# Patient Record
Sex: MALE | Race: WHITE | Employment: OTHER | URBAN - METROPOLITAN AREA
[De-identification: names, ages, dates, MRNs, and addresses within clinical notes are randomized per-mention and may not be internally consistent; named-entity substitution may affect disease eponyms.]

---

## 2022-12-02 ENCOUNTER — HOSPITAL ENCOUNTER (OUTPATIENT)
Age: 77
Setting detail: OUTPATIENT SURGERY
Discharge: HOME | End: 2022-12-02
Attending: SPECIALIST | Admitting: SPECIALIST
Payer: MEDICARE

## 2022-12-02 VITALS
HEIGHT: 66 IN | BODY MASS INDEX: 33.01 KG/M2 | DIASTOLIC BLOOD PRESSURE: 81 MMHG | WEIGHT: 205.4 LBS | HEART RATE: 65 BPM | OXYGEN SATURATION: 97 % | RESPIRATION RATE: 10 BRPM | SYSTOLIC BLOOD PRESSURE: 153 MMHG | TEMPERATURE: 98.4 F

## 2022-12-02 DIAGNOSIS — R07.89 OTHER CHEST PAIN: ICD-10-CM

## 2022-12-02 PROCEDURE — 74011250636 HC RX REV CODE- 250/636: Performed by: SPECIALIST

## 2022-12-02 PROCEDURE — C1894 INTRO/SHEATH, NON-LASER: HCPCS | Performed by: SPECIALIST

## 2022-12-02 PROCEDURE — 77030003390 HC NDL ANGI MRTM -A: Performed by: SPECIALIST

## 2022-12-02 PROCEDURE — 2709999900 HC NON-CHARGEABLE SUPPLY: Performed by: SPECIALIST

## 2022-12-02 PROCEDURE — 93458 L HRT ARTERY/VENTRICLE ANGIO: CPT | Performed by: SPECIALIST

## 2022-12-02 PROCEDURE — 74011000250 HC RX REV CODE- 250: Performed by: SPECIALIST

## 2022-12-02 PROCEDURE — C1769 GUIDE WIRE: HCPCS | Performed by: SPECIALIST

## 2022-12-02 PROCEDURE — 99152 MOD SED SAME PHYS/QHP 5/>YRS: CPT | Performed by: SPECIALIST

## 2022-12-02 PROCEDURE — 77030004532 HC CATH ANGI DX IMP BSC -A: Performed by: SPECIALIST

## 2022-12-02 PROCEDURE — 77030029065 HC DRSG HEMO QCLOT ZMED -B

## 2022-12-02 PROCEDURE — 77030015766: Performed by: SPECIALIST

## 2022-12-02 PROCEDURE — 74011000636 HC RX REV CODE- 636: Performed by: SPECIALIST

## 2022-12-02 RX ORDER — DIPHENHYDRAMINE HYDROCHLORIDE 50 MG/ML
25 INJECTION, SOLUTION INTRAMUSCULAR; INTRAVENOUS
Status: DISCONTINUED | OUTPATIENT
Start: 2022-12-02 | End: 2022-12-02 | Stop reason: HOSPADM

## 2022-12-02 RX ORDER — SODIUM CHLORIDE 9 MG/ML
75 INJECTION, SOLUTION INTRAVENOUS CONTINUOUS
Status: DISCONTINUED | OUTPATIENT
Start: 2022-12-02 | End: 2022-12-02

## 2022-12-02 RX ORDER — CALCIUM CARBONATE 200(500)MG
1 TABLET,CHEWABLE ORAL
COMMUNITY

## 2022-12-02 RX ORDER — HEPARIN SODIUM 200 [USP'U]/100ML
INJECTION, SOLUTION INTRAVENOUS
Status: COMPLETED | OUTPATIENT
Start: 2022-12-02 | End: 2022-12-02

## 2022-12-02 RX ORDER — LOSARTAN POTASSIUM 50 MG/1
50 TABLET ORAL DAILY
COMMUNITY

## 2022-12-02 RX ORDER — GUAIFENESIN 100 MG/5ML
81 LIQUID (ML) ORAL DAILY
Status: SHIPPED | COMMUNITY
Start: 2022-12-02

## 2022-12-02 RX ORDER — ALLOPURINOL 100 MG/1
100 TABLET ORAL DAILY
COMMUNITY

## 2022-12-02 RX ORDER — FENTANYL CITRATE 50 UG/ML
INJECTION, SOLUTION INTRAMUSCULAR; INTRAVENOUS AS NEEDED
Status: DISCONTINUED | OUTPATIENT
Start: 2022-12-02 | End: 2022-12-02 | Stop reason: HOSPADM

## 2022-12-02 RX ORDER — SODIUM CHLORIDE 0.9 % (FLUSH) 0.9 %
5-40 SYRINGE (ML) INJECTION AS NEEDED
Status: DISCONTINUED | OUTPATIENT
Start: 2022-12-02 | End: 2022-12-02 | Stop reason: HOSPADM

## 2022-12-02 RX ORDER — SODIUM CHLORIDE 0.9 % (FLUSH) 0.9 %
5-40 SYRINGE (ML) INJECTION EVERY 8 HOURS
Status: DISCONTINUED | OUTPATIENT
Start: 2022-12-02 | End: 2022-12-02 | Stop reason: HOSPADM

## 2022-12-02 RX ORDER — MIDAZOLAM HYDROCHLORIDE 1 MG/ML
INJECTION, SOLUTION INTRAMUSCULAR; INTRAVENOUS AS NEEDED
Status: DISCONTINUED | OUTPATIENT
Start: 2022-12-02 | End: 2022-12-02 | Stop reason: HOSPADM

## 2022-12-02 RX ORDER — AMLODIPINE BESYLATE 10 MG/1
10 TABLET ORAL DAILY
COMMUNITY

## 2022-12-02 RX ORDER — LIDOCAINE HYDROCHLORIDE 10 MG/ML
INJECTION INFILTRATION; PERINEURAL AS NEEDED
Status: DISCONTINUED | OUTPATIENT
Start: 2022-12-02 | End: 2022-12-02 | Stop reason: HOSPADM

## 2022-12-02 RX ORDER — ATORVASTATIN CALCIUM 20 MG/1
20 TABLET, FILM COATED ORAL DAILY
Qty: 30 TABLET | Refills: 5 | Status: SHIPPED | OUTPATIENT
Start: 2022-12-02

## 2022-12-02 RX ORDER — SODIUM CHLORIDE 9 MG/ML
125 INJECTION, SOLUTION INTRAVENOUS CONTINUOUS
Status: DISCONTINUED | OUTPATIENT
Start: 2022-12-02 | End: 2022-12-02 | Stop reason: HOSPADM

## 2022-12-02 RX ORDER — OMEPRAZOLE 20 MG/1
20 CAPSULE, DELAYED RELEASE ORAL
COMMUNITY

## 2022-12-02 RX ORDER — UBIDECARENONE 75 MG
300 CAPSULE ORAL DAILY
COMMUNITY

## 2022-12-02 RX ORDER — HYDROCORTISONE SODIUM SUCCINATE 100 MG/2ML
100 INJECTION, POWDER, FOR SOLUTION INTRAMUSCULAR; INTRAVENOUS
Status: DISCONTINUED | OUTPATIENT
Start: 2022-12-02 | End: 2022-12-02 | Stop reason: HOSPADM

## 2022-12-02 RX ADMIN — SODIUM CHLORIDE 75 ML/HR: 9 INJECTION, SOLUTION INTRAVENOUS at 09:56

## 2022-12-02 NOTE — ROUTINE PROCESS
Received patient from waiting area. Armband and allergies verbally confirmed with patient. Procedure explained and consents signed. 1041: TRANSFER - OUT REPORT:    Verbal report given to Lynn Pina (name) on Sreedhar Russo  being transferred to cath lab(unit) for routine progression of care       Report consisted of patients Situation, Background, Assessment and   Recommendations(SBAR). Information from the following report(s) SBAR was reviewed with the receiving nurse. Lines:   Peripheral IV 12/02/22 Right Antecubital (Active)   Site Assessment Clean, dry, & intact 12/02/22 0953   Phlebitis Assessment 0 12/02/22 0953   Infiltration Assessment 0 12/02/22 0953   Dressing Status Clean, dry, & intact 12/02/22 0953   Dressing Type Transparent 12/02/22 0953   Hub Color/Line Status Pink;Flushed; Infusing 12/02/22 3645        Opportunity for questions and clarification was provided. Patient transported with:   Registered Nurse    1120: TRANSFER - IN REPORT:    Verbal report received from [facility]) on Sreedhar Rodriguez  being received from cath lab(unit) for routine post - op      Report consisted of patients Situation, Background, Assessment and   Recommendations(SBAR). Information from the following report(s) Procedure Summary was reviewed with the receiving nurse. Opportunity for questions and clarification was provided. Assessment completed upon patients arrival to unit and care assumed. 1120: Patient received from cath lab. Patient with sheath to right groin site. Site clean, dry and intact. No hematoma. Pulses palpable. VS stable. Patient with no complaints at this time. HOB flat. 11:27: Blood aspirated from sheath then Arterial sheath pulled 6 Fr R Groin. Grupoal Kristine applied. Manual pressure held by [facility] . 11:40: Hemostasis achieved at 11:40. Dressing applied. Pt voices understanding of post procedure bedrest instructions. 1310: Patient HOb elevated 30 degrees. Patient with quikclot to right groin site. Site clean, dry and intact. No hematoma. Pulses palpable. VS stable. Patient with no complaints at this time. 1350: Patient HOb elevated 45 degrees. Patient with quikclot to right groin site. Site clean, dry and intact. No hematoma. Pulses palpable. VS stable. Patient with no complaints at this time. 1400: Discharge instructions and prescriptions reviewed with patient and his wife and daughter. Opportunity provided for questions. Patient, wife and daughter verbalized understanding. Signed copy of discharge placed in the front of patient's chart. 1430: Patient dangled on the side of the bed. Site CDI. No complaints at this time. 1435: Patient ambulated to the bathroom. Voided. Gait steady. No complaints at this time. 1440: IV and tele removed. 1450: Patient escorted via wheelchair to entrance. Patient daughter driving. Patient discharged into care of daughter and wife.

## 2022-12-02 NOTE — Clinical Note
[address] [facility] RN IN CLPO UPDATED ON PTS STATUS IN LAB.  VERBAL BEDSIDE REPORT TO BE GIVEN IN CLPO TO RECEIVING RN

## 2022-12-02 NOTE — DISCHARGE INSTRUCTIONS
Discharge Instructions:   Cardiac Catheterization/Angiography Discharge Instructions    *Check the puncture site frequently for swelling or bleeding. If you see any bleeding, lie down and apply pressure over the area and call 911. Notify your doctor for any redness, swelling, drainage or oozing from the puncture site. Notify your doctor for any fever or chills. *If the leg or arm with the puncture becomes cold, numb or painful, call Dr Nelli Palomino at  [phone]    *Activity should be limited for the next 48 hours. Climb stairs as little as possible and avoid any stooping, bending or strenuous activity for 48 hours. No heavy lifting (anything over 10 pounds) for five days. *Do not drive for 24 hours. *You may resume your usual diet. Drink more fluids than usual.    *Have a responsible person drive you home and stay with you for at least 24 hours after your heart catheterization/angiography. *You may remove the bandage from your groin in 24 hours. You may shower in 24 hours. No tub baths, hot tubs or swimming for one week. Do not place any lotions, creams, powders, ointments over the puncture site for one week. You may place a clean band-aid over the puncture site each day for 5 days. Change this daily. Medications: Take medications as prescribed. Activity:     As tolerated except do not lift over 10 pounds for 5 days. Diet:     American Heart Association. Follow-up:     Follow up with Chino Cote MD on Monday, December 5th, 2022 at 8:30am.  [address] [place] [address]  [phone]      If you smoke, STOP!

## 2022-12-02 NOTE — CARDIO/PULMONARY
Cardiopulmonary Rehab: Slade Johnson admitted for cardiac cath. S/P cath with severe CAD with obstructive 3VD(12/2/22). [id number]% Cardiologist is Dr Marcos Earl. Per Dr Marcos Earl  , plan for CTS consult with Dr Go Pike next week at [address] [facility].

## 2022-12-02 NOTE — PROCEDURES
Cath:  Obstructive 3VD:     LM 70     LAD m80     LCx ost70, p30     RCA ost50, m30, d99  Normal LVF (EF 65%)  No AVG, MR 1+  Patent L SC  RFA manual    Given stable obstructive disease, he is going home, but will F/U with Dr. Go Pike Monday 12/5/22 @ 8:30am to discuss/arrange CABG.   Start ASA, statin

## 2022-12-02 NOTE — H&P
Date of Surgery Update:  Denny Redd was seen and examined. History and physical has been reviewed. The patient has been examined. There have been no significant clinical changes since the completion of the originally dated History and Physical.    Signed By: Kala Kowalski MD     December 2, 2022 10:06 AM        Classic anginal symptoms. Vivek Acosta 1945 Default Progress NoteVisit Date: Wed, Nov 30, 2022 12:00 pmProvider: Delfina Hart MD (Assistant: Kaiden Diaz RN )Location: [facility] AT [facility] ([facility])[address] [place]. [address] [phone] signed by Hermelindo Barnard MD on  11/30/2022 12:47:40 PM                         Subjective:CC: Mr. Josh Ellsworth is a 68year old White male. His primary care physician is Ros Cueva MD.  Ros Cueva MD referred Mr. Josh Ellsworth to the practice for a consult. He presents today with a complaint of chest pain. He is here today following a transition of care from his primary care provider. Patient will call back with list of supplementsPatient did not bring medication list or bottles for review. Patient verbalized medications He has a history of essential hypertension. HPI: classic cp with walking, now unable to walk, for cath  Regarding chest pain:MD Notes: Patient reports he walks alot and episode started when he was on his walk The discomfort is located primarily in the left anterior chest.  The pain initially began one month ago. He characterizes the pain as tightness and Discomfort. It seems to be worse with exertion. Pain improves with rest.  Associated symptoms include heartburn. He states it is not a pain, more a discomfort sometimes tightness with his walking and when he slows his walking it stops. He is an avid walker. At night laying on his side he will feel it too sometimes. No SOB noted. He does have GERD and was gone and now coming back, and notes more gas then he use to have.  He had a stress test many years ago and, so long he can not remember why. He usually does not feel bad and this is not like him. He is sure he will get the sxs on the treadmill. Cardiac cath discussed. He sounds good today  Regarding hypertension: Currently, his treatment regimen consists of an ACE/ARB ( Losartan ) and Norvasc (amlodipine). ROS: Discussed relevant lab and imaging studies along with the plan of treatment with the nurse. EYES:  Negative for blurred vision and eye pain. E/N/T:  Negative for epistaxis and hoarseness. CARDIOVASCULAR:  Please see HPI. RESPIRATORY:  Negative for cough and hemoptysis. GASTROINTESTINAL:  Negative for abdominal pain and dysphagia. MUSCULOSKELETAL:  Negative for arthralgias and back pain. NEUROLOGICAL:  Negative for headaches, paresthesias, and weakness. HEMATOLOGIC/LYMPHATIC:  Negative for easy bruising, bleeding, and lymphadenopathy. PSYCHIATRIC:  No symptoms reported. Past Medical History / Family History / Social History: Last Reviewed on 2022 12:23 PM by Dimitrios Aguirre Medical History: gout Hypertension Surgical History: Surgical/Procedural History: Knee Replacement: bilateral; Rotator cuff surgery carpal tunnel Family History: Father:  at age 67; Cause of death was auto accident Mother:  at age 77; Cause of death was MI;  type 2 diabetes Social History: Social History: Occupation: Retired Marital Status:  Children: 1 child Tobacco/Alcohol/Supplements: Last Reviewed on 2022 12:23 PM by Bernardo Villanueva ETOBACCO/ALCOHOL/SUPPLEMENTS Tobacco: He has a past history of cigarette smoking; quit . Alcohol: Drinks alcohol occasionally. Caffeine:  He admits to consuming caffeine via coffee ( 2 servings per day ). Substance Abuse History: Last Reviewed on 2022 12:23 PM by Bernardo Villanueva [facility] Health History: Last Reviewed on 2022 12:23 PM by Mary Kay Heritage Diseases (eg STDs):  Last Reviewed on 2022 12:23 PM by Dalia Andres Andrew Astudillo EAllergies: Last Reviewed on 11/30/2022 12:23 PM by Verla Record Lou Known Allergies. Current Medications: Last Reviewed on 11/30/2022 12:23 PM by Randi DUARTEOPURINOL 100MG TAB omeprazole 20 mg oral capsule,delayed release (enteric coated) [take 1 capsule (20 mg) by oral route 3 times a week]amLODIPine 10 mg oral tablet [take 1 tablet (10 mg) by oral route once daily]losartan 50 mg oral tablet [take 1 tablet (50 mg) by oral route once per day]Objective:Vitals: Current: 11/30/2022 12:21:58 PMHt:  5 ft, 6 in; Wt: 208 lbs;  BMI: 33. 6BP: 148/84 mm Hg (left arm, sitting);  P: 64 bpmExams: GENERAL:  Alert, oriented to person, place and time x3. EYES:  Normal lids without xanthelasma; conjunctiva unremarkable; no scleral icterus. HEENT:  Face symmetric, voice clear, extraocular muscles intact. NECK:  Supple. No bruits, No JVD. LUNGS:  Clear to auscultation. No rales, no wheezing. CARDIAC:  Regular rate and rhythm. PMI not displaced. ABDOMEN:  Soft. Positive bowel sounds. Non-tender. MUSCULOSKELETAL:  Normal range of motion, strength and tone. EXTREMITIES:  No edema. Good muscle tone and strength. SKIN: No significant rashes or lesions; no suspicious moles. NEUROLOGICAL:  No focal deficits, cranial nerves II-XII are grossly intact. PSYCHIATRIC:  Appropriate affect and demeanor; normal speech pattern; grossly normal memory. Procedures: Chest pain, unspecifiedECG INTERPRETATION: See scanned EKG for results. Assessment: R07.9   Chest pain, unspecified   R07.89   Other chest pain   I10   Essential (primary) hypertension   ORDERS: Procedures Ordered:     10110  Education and train for pt self-mgmt by qualified, nonphysician, arun 30 minutes; individual pt  (Send-Out)          10715  Electrocardiogram, routine with at least 12 leads; with interpretation and report  (In-House)          XCATH  Cardiac Cath  (In-House)      Plan: Chest pain, unspecifiedMedication list has been reviewed.  Continue current medications. Testing/Procedures: Cardiac Catheterization - Explained to the patient the indication, procedure, risks, and benefits of cardiac catheterization. The patient understands and wishes to proceed with the cath to be performed this week at [place] [address] for evaluation of chest pain. Schedule a follow up appointment in 2 weeks. Discussed with patient diet, exercise plan and lifestyle modifications. The above note was transcribed by Vidhya Negron LPN and authenticated by Dr. Juana Moore prior to sign off. Orders:     85020  Education and train for pt self-mgmt by qualified, nonphysician, arun 30 minutes; individual pt  (Send-Out)          00913  Electrocardiogram, routine with at least 12 leads; with interpretation and report  (In-House)          XCATH  Cardiac Cath  (In-House)        Patient Education Handouts:   COV Chest Pain    COV Heart Healthy Diet  Patient Recommendations: For  Chest pain, unspecified:Your medication list has been reviewed. Continue current medications. You need to have the following test/procedure(s) done: Cardiac Catheterization - Explained to you the indication, procedure, risks and benefits of cardiac catheterization. You expressed a desire to go forward with the procedure to be performed this week. at [place] [address] for evaluation of ^ chest pain Schedule a follow-up visit in 2 weeks.

## (undated) DEVICE — HEART CATH-SFMC: Brand: MEDLINE INDUSTRIES, INC.

## (undated) DEVICE — GUIDEWIRE VASC L180CM DIA0.035IN 3MM PTFE J TIP EXCHG FIX

## (undated) DEVICE — NEEDLE ANGIO 18GA L9CM NRML 1 WALL SMOOTH FINISH CLR HUB FOR

## (undated) DEVICE — RADIFOCUS OPTITORQUE ANGIOGRAPHIC CATHETER: Brand: OPTITORQUE

## (undated) DEVICE — CATH DIAG D 6F PIG 155 5 PK -- IMPULSE 16599-42

## (undated) DEVICE — ADMINISTRATION SET 72 IN SINGLE LUER LCK NAMIC

## (undated) DEVICE — PINNACLE INTRODUCER SHEATH: Brand: PINNACLE